# Patient Record
(demographics unavailable — no encounter records)

---

## 2024-10-16 NOTE — HEALTH RISK ASSESSMENT
[No] : No [No falls in past year] : Patient reported no falls in the past year [Former] : Former [< 15 Years] : < 15 Years

## 2024-10-16 NOTE — HISTORY OF PRESENT ILLNESS
[FreeTextEntry1] : Pt is a 73 y/o M who presents today for initial evaluation. 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 He is scheduled for R knee replacement with Dr Bhardwaj 11/14/2024 He states that he is feeling well overall - denies CP, SOB, diaphoresis, palpitations, dizziness, syncope, LE edema, PND, orthopnea.   TTE 10/2017 EF 55-60%, mild TR CUS 05/2018 mild plaque abd US 08/2020 no AAA   Previous hospitalizations: TIA Previous surgeries: L knee replaced 2021, shoulder replacement 04/2023 - no problems with anesthesia Family hx: no SCD, no cardiac disease Smoking status: quit at age 53 social ETOH no drug use Current exercise: light weights, walking daily 1/2 mile Daily water intake: couple of pints  Daily caffeine intake: 1 cup coffee OTC medications: tylenol PRN

## 2024-10-16 NOTE — REVIEW OF SYSTEMS
[Negative] : Genitourinary [FreeTextEntry6] : Mild dyspnea on exertion [FreeTextEntry9] : Right knee pain

## 2024-10-16 NOTE — DISCUSSION/SUMMARY
[FreeTextEntry1] : Pt is a 75 y/o M who presents today for initial evaluation. 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 He is scheduled for R knee replacement with Dr Bhardwaj 11/14/2024  Will check transthoracic echocardiogram to evaluate left ventricular function and assess for any structural abnormalities check nuc stress test  Further preop recommendations will be made once diagnostic testing is complete.   TIA: c/w lipitor and ASA  The described plan was discussed with the pt.  All questions and concerns were addressed to the best of my knowledge.  [EKG obtained to assist in diagnosis and management of assessed problem(s)] : EKG obtained to assist in diagnosis and management of assessed problem(s)

## 2024-10-16 NOTE — HISTORY OF PRESENT ILLNESS
[FreeTextEntry1] : Hyperlipidemia nonobstructive coronary artery disease [de-identified] : Patient is planning on right total knee replacement.  Has a history of nonobstructive coronary artery disease denies cardiovascular symptoms except for dyspnea on exertion I feel cardiac presurgical testing is indicated patient referred to cardiology.  Hyperlipidemia well-controlled on atorvastatin  Nephrolithiasis patient feels he might have passed a stone since he was seen last he is followed by urology every year history of hydronephrosis  Elevated A1c at 6.1 we will recheck it  Thrombocytopenia most recent platelet count 130,000 no easy bruising  History of pulmonary emboli in 1979 no recurrence  Pulmonary nodule followed regularly by cardiothoracic surgery

## 2024-11-06 NOTE — REASON FOR VISIT
[Home] : at home, [unfilled] , at the time of the visit. [Medical Office: ([facility])___] : at the medical office located in  [Verbal consent obtained from patient] : the patient, [unfilled]

## 2024-11-06 NOTE — REASON FOR VISIT
[Home] : at home, [unfilled] , at the time of the visit. [Medical Office: ([facility])___] : at the medical office located in  [Verbal consent obtained from patient] : the patient, [unfilled]

## 2024-11-07 NOTE — HISTORY OF PRESENT ILLNESS
[FreeTextEntry1] : Mr. NOHEMI GALVAN is a 75-year-old male referred by Dr. Brown who presents for a follow up appointment regarding a lung nodule found incidentally on lung cancer screening CT scan. Pertinent past medical history includes nephrolithiasis, hypertension, transient ischemic attack, smoking (37 years, quit 2003), multiple blood clots in his lungs in 1979.  4/5/24 CT Lung screening non-contrast at [facility] - Mild emphysema, linear atelectasis/scarring in the right lower lobe, and an ill-defined 9 mm subpleural nodule left upper lobe  8/7/24 CT Chest at [facility] Rad[facility]: - There is bibasal linear atelectasis/scarring - 9 mm subpleural left upper lobe nodule  11/4/24 CT chest at [facility]: - No suspicious mass or lymphadenopathy. - There is mild emphysema, stable bibasal linear atelectasis/scarring, and a stable 9 mm subpleural left lower lobe nodule  Mr. Galvan presents today to review surveillance CT chest imaging. Patient reports that he is feeling well; he denies chest pain, palpitations, shortness of breath, cough, dysphagia, nausea/vomiting, fevers, chills, fatigue, unintentional weight loss or gain, and night sweats. Of note, he states that he is having a knee replacement next week.

## 2024-11-07 NOTE — REVIEW OF SYSTEMS
[Fever] : no fever [Chills] : no chills [Feeling Poorly] : not feeling poorly [Feeling Tired] : not feeling tired [Chest Pain] : no chest pain [Palpitations] : no palpitations [Lower Ext Edema] : no extremity edema [Shortness Of Breath] : no shortness of breath [Wheezing] : no wheezing [Cough] : no cough [SOB on Exertion] : no shortness of breath during exertion [Negative] : Heme/Lymph

## 2024-11-07 NOTE — DATA REVIEWED
[FreeTextEntry1] : Independent review of imaging and independent interpretation was performed at today's visit: - 11/4/24 CT chest at [facility]

## 2024-11-07 NOTE — DATA REVIEWED
[FreeTextEntry1] : Independent review of imaging and independent interpretation was performed at today's visit: - 11/4/24 CT chest at [facility]

## 2024-11-07 NOTE — ASSESSMENT
[FreeTextEntry1] : Mr. NOHEMI KIRK is a 75-year-old male referred by Dr. Brown who presents for a follow up appointment regarding a lung nodule found incidentally on lung cancer screening CT scan. Pertinent past medical history includes nephrolithiasis, hypertension, transient ischemic attack, smoking (37 years, quit 2003), multiple blood clots in his lungs in 1979.  11/4/24 CT chest at [facility]: - No suspicious mass or lymphadenopathy. - There is mild emphysema, stable bibasal linear atelectasis/scarring, and a stable 9 mm subpleural left lower lobe nodule  After review of the imaging there is a nodule present in the lung. We discussed that nodules can be infectious, inflammatory or malignant in nature. When evaluating suspicious nodules, we look at certain characteristics of nodules such as size, consistency, shape, and rate of growth. Mr. Kirk has a stable 9mm subpleural left lower lobe nodule. Given the stability, I am recommending repeat CT chest imaging in 1 year.  I have reviewed the patient's medical records and diagnostic images at time of this consultation and have made the following recommendations: 1. CT scan of the chest, non-contrast, in 1 year 2. Return to care for review of CT imaging in 1 year, or sooner if needed.  All questions answered, patient verbalizes understanding and agrees to follow up accordingly.   ** Total time spent on this encounter was 5 minutes  I, Dr. Merino, personally performed the evaluation and management (E/M) services for this established patient who presents today with an existing condition(s).  That E/M includes conducting the examination, assessing all new/exacerbated conditions, and establishing a plan of care.  Today, my ACP, Brandy Hackett NP, was here to observe my evaluation and management services for this new problem/exacerbated condition to be followed going forward.

## 2024-11-07 NOTE — ASSESSMENT
[FreeTextEntry1] : Mr. NOHEMI KIRK is a 75-year-old male referred by Dr. Brown who presents for a follow up appointment regarding a lung nodule found incidentally on lung cancer screening CT scan. Pertinent past medical history includes nephrolithiasis, hypertension, transient ischemic attack, smoking (37 years, quit 2003), multiple blood clots in his lungs in 1979.  11/4/24 CT chest at [facility]: - No suspicious mass or lymphadenopathy. - There is mild emphysema, stable bibasal linear atelectasis/scarring, and a stable 9 mm subpleural left lower lobe nodule  After review of the imaging there is a nodule present in the lung. We discussed that nodules can be infectious, inflammatory or malignant in nature. When evaluating suspicious nodules, we look at certain characteristics of nodules such as size, consistency, shape, and rate of growth. Mr. Kirk has a stable 9mm subpleural left lower lobe nodule. Given the stability, I am recommending repeat CT chest imaging in 1 year.  I have reviewed the patient's medical records and diagnostic images at time of this consultation and have made the following recommendations: 1. CT scan of the chest, non-contrast, in 1 year 2. Return to care for review of CT imaging in 1 year, or sooner if needed.  All questions answered, patient verbalizes understanding and agrees to follow up accordingly.   ** Total time spent on this encounter was 5 minutes  I, Dr. Merino, personally performed the evaluation and management (E/M) services for this established patient who presents today with an existing condition(s).  That E/M includes conducting the examination, assessing all new/exacerbated conditions, and establishing a plan of care.  Today, my ACP, Brandy Hackett NP, was here to observe my evaluation and management services for this new problem/exacerbated condition to be followed going forward.

## 2024-11-07 NOTE — HISTORY OF PRESENT ILLNESS
[FreeTextEntry1] : Mr. NOHEMI GALVAN is a 75-year-old male referred by Dr. Brown who presents for a follow up appointment regarding a lung nodule found incidentally on lung cancer screening CT scan. Pertinent past medical history includes nephrolithiasis, hypertension, transient ischemic attack, smoking (37 years, quit 2003), multiple blood clots in his lungs in 1979.  4/5/24 CT Lung screening non-contrast at [facility] - Mild emphysema, linear atelectasis/scarring in the right lower lobe, and an ill-defined 9 mm subpleural nodule left upper lobe  8/7/24 CT Chest at [facility]: - There is bibasal linear atelectasis/scarring - 9 mm subpleural left upper lobe nodule  11/4/24 CT chest at [facility]: - No suspicious mass or lymphadenopathy. - There is mild emphysema, stable bibasal linear atelectasis/scarring, and a stable 9 mm subpleural left lower lobe nodule  Mr. Galvan presents today to review surveillance CT chest imaging. Patient reports that he is feeling well; he denies chest pain, palpitations, shortness of breath, cough, dysphagia, nausea/vomiting, fevers, chills, fatigue, unintentional weight loss or gain, and night sweats. Of note, he states that he is having a knee replacement next week.

## 2024-11-08 NOTE — HISTORY OF PRESENT ILLNESS
[No Pertinent Cardiac History] : no history of aortic stenosis, atrial fibrillation, coronary artery disease, recent myocardial infarction, or implantable device/pacemaker [No Pertinent Pulmonary History] : no history of asthma, COPD, sleep apnea, or smoking [No Adverse Anesthesia Reaction] : no adverse anesthesia reaction in self or family member [Chronic Anticoagulation] : no chronic anticoagulation [Chronic Kidney Disease] : no chronic kidney disease [Diabetes] : no diabetes [(Patient denies any chest pain, claudication, dyspnea on exertion, orthopnea, palpitations or syncope)] : Patient denies any chest pain, claudication, dyspnea on exertion, orthopnea, palpitations or syncope [FreeTextEntry1] : Right total knee replacement [FreeTextEntry2] : November 14 [FreeTextEntry3] : Dr. Bhardwaj [FreeTextEntry4] : Patient has debilitating pain right knee x-ray shows bone-on-bone to have total knee replacement  He had left total knee replacement approximately 3 years ago surgery was uneventful  He has been cleared by cardiology with a stress test echocardiogram both of which are normal  He has chronic slight thrombocytopenia platelet count 126,000 Nonobstructive coronary artery disease with a low calcium score on atorvastatin for primary prevention.  Patient walks at least half a mile daily no cardiovascular symptoms

## 2024-12-03 NOTE — HISTORY OF PRESENT ILLNESS
[0] : no pain reported [Chills] : no chills [Constipation] : no constipation [Diarrhea] : no diarrhea [Dysuria] : no dysuria [Fever] : no fever [Nausea] : no nausea [Vomiting] : no vomiting [Healed] : healed [Erythema] : not erythematous [Discharge] : absent of discharge [Swelling] : swollen [Dehiscence] : not dehisced [Neuro Intact] : an unremarkable neurological exam [Vascular Intact] : ~T peripheral vascular exam normal [Negative Deanna's] : maneuvers demonstrated a negative Deanna's sign [de-identified] : s/p Right total knee arthroplasty. Computer-assisted tibia resection, OrthAlign procedure DOS:11/14/24 [de-identified] : Patient is 3 weeks from right total knee arthroplasty he states he complains of tightness and pain in the right lower extremity at night when he rest he feels better with walking he has stopped using oxycodone for pain he is able to do some yard work outside blowing some leaves he is ambulating with a cane he is ready to start outpatient physical therapy he is taking Eliquis [de-identified] :  Right knee exam shows healing incision with no sign of infection, ROM 0 to 100 degrees. The surgical incision site(s) swollen, but clean, dry and intact, healed, not erythematous and not dehisced. Additional findings included an unremarkable neurological exam, peripheral vascular exam normal and maneuvers demonstrated a negative Deanna's sign.   [de-identified] :  3V xray of the right knee done in the office today and reviewed and demonstrates s/p implants in good positioning with no evidence of wear, loosening, or subsidence.   [de-identified] : Patient will start out pt physical therapy and low impact exercise. continue elevated, ice, and pain management.   Discontinue Eliquis and start baby aspirin twice a day for DVTP medication until December 24. follow-up in  6 weeks

## 2025-01-22 NOTE — END OF VISIT
[FreeTextEntry3] : Documented by Nacho Majano acting solely as a scribe for Dr. Wero Bhardwaj on this date 01/22/2025.   All Medical record entries made by the Scribe were at my, Dr. Wero Bhardwaj's, direction and personally dictated by me on 01/22/2025. I have reviewed the chart and agree that the record accurately reflects my personal performance of the history, physical exam, assessment and plan. I have also personally directed, reviewed, and agreed with the discharge instructions.

## 2025-01-22 NOTE — HISTORY OF PRESENT ILLNESS
[2] : the patient reports pain that is 2/10 in severity [Chills] : no chills [Constipation] : no constipation [Diarrhea] : no diarrhea [Dysuria] : no dysuria [Fever] : no fever [Nausea] : no nausea [Vomiting] : no vomiting [Xray (Date:___)] : [unfilled] Xray -  [de-identified] : s/p Right total knee arthroplasty. Computer-assisted tibia resection, OrthAlign procedure DOS:11/14/24.   [de-identified] : Donovan is a 75-year-old male who presents today for follow-up evaluation status post right total knee arthroplasty 11/14/2024.  He said overall, he is doing well.  He does report intermittent, mild to moderate sharp twinges of discomfort over the anterior aspect the knee when sitting for any extended amount of time.  This seems to be relatively short-lived and is relieved with ambulation.  He denies any mechanical symptoms or instability. [de-identified] : Examination of the right knee reveals well-healed surgical incision.  There is mild effusion without warmth or erythema.  Range of motion is 3 to 120 degrees of knee flexion.  No gross instability.  5/5 strength. [de-identified] : 3V xray of the right knee done in office today and reviewed by Dr. Wero Bhardwaj demonstrates s/p right knee implants in good positioning with no evidence of wear, loosening, or subsidence. [de-identified] : 75-year-old male status post right total knee arthroplasty on 11/14/2024.  His implants are stable on x-ray.  He does note some residual stiffness with regard to terminal extension. [de-identified] : We have agreed to continue with formalized physical therapy with a focus on obtaining full range of motion. A refill of physical therapy was provided.  Patient will follow-up in 6 weeks for re-evaluation.

## 2025-03-04 NOTE — HISTORY OF PRESENT ILLNESS
[Pain Location] : pain [Stable] : stable [1] : a current pain level of 1/10 [Rest] : relieved by rest [de-identified] : Status post right total knee arthroplasty on November 14, 2024 He is 2 weeks over 3-month from the surgery He is walking well but he continues complains of stiffness and swelling at the end of the day and after exercise.  Patient complains of mild pain in the anterior shin. he is in physical therapy he notes his condition is improving with PT would like to continue he is ambulating without walking assistive device he denies Severe pain he denies locking or buckling no fever chills [de-identified] : Swelling

## 2025-03-04 NOTE — REASON FOR VISIT
[Follow-Up Visit] : a follow-up visit for [Other: ____] : [unfilled] [FreeTextEntry2] : s/p Right total knee arthroplasty. Computer-assisted tibia resection, OrthAlign procedure DOS:11/14/24.

## 2025-03-04 NOTE — PHYSICAL EXAM
[de-identified] : GENERAL APPEARANCE: Well nourished and hydrated, pleasant, alert, and oriented x 3. Appears their stated age.  HEENT: Normocephalic, extraocular eye motion intact. Nasal septum midline. Oral cavity clear. External auditory canal clear.  RESPIRATORY: Breath sounds clear and audible in all lobes. No wheezing, No accessory muscle use. CARDIOVASCULAR: No apparent abnormalities. No lower leg edema. No varicosities. Pedal pulses are palpable. NEUROLOGIC: Sensation is normal, no muscle weakness in the upper or lower extremities. DERMATOLOGIC: No apparent skin lesions, moist, warm, no rash. SPINE: Cervical spine appears normal and moves freely; thoracic spine appears normal and moves freely; lumbosacral spine appears normal and moves freely, normal, nontender. MUSCULOSKELETAL: Hands, wrists, and elbows are normal and move freely, shoulders are normal and move freely.  Musculoskeletal 5/5 motor strength in bilateral lower extremities. Sensory: Intact in bilateral lower extremities. DTRs: Biceps, brachioradialis, triceps, patellar, ankle and plantar 2+ and symmetric bilaterally. Pulses: dorsalis pedis, posterior tibial, femoral, popliteal, and radial 2+ and symmetric bilaterally.  Constitutional: Alert and in no acute distress, but well-appearing.   [de-identified] : Right knee examination showed healed incision he has mild hyperpigmentation in the shin with mild swelling patient walks without antalgic gait but he continues to have flexion contraction of 2 degree when he extend the knee flexion is 115 degree [de-identified] :  3V xray of the right knee done in the office today and reviewed and demonstrates s/p implants in good positioning with no evidence of wear, loosening, or subsidence.

## 2025-03-04 NOTE — DISCUSSION/SUMMARY
[de-identified] : This is a 75-year-old male status post right total knee arthroplasty on November 14, 2024 he is little over 3 months from the surgery he is recovering well he continues to have some swelling after activities which is normal he has mild stiffness with extension but it is improving with physical therapy we recommend continuing physical therapy and low impact exercise his shin pain should improve with time I suspect related to postop inflammatory swelling that radiates down to his right lower extremity .  No sign of DVT or infection x-rays are stable showing implants are seated well onto his bones no sign of subsidence no sign of loosening no fracture.  I do not think he will need additional surgical intervention for his knees . if he regresses we will see him back sooner otherwise we can see him a year from the surgery.  We spoke about use of antibiotic prior to dental work for next 2 years I spent 20 minutes review of medical history, assessing current issue, conduct  physical exam ,  review of diagnostic studies,  discuss the diagnosis,  couseling regarding pathophysiology of its condition  and its treatment plan  with the patient.

## 2025-03-05 NOTE — PHYSICAL EXAM
[Normal] : soft, non-tender, non-distended, no masses palpated, no HSM and normal bowel sounds [de-identified] : Varicose veins right leg

## 2025-03-05 NOTE — HISTORY OF PRESENT ILLNESS
Problem: Chronic Conditions and Co-morbidities  Goal: Patient's chronic conditions and co-morbidity symptoms are monitored and maintained or improved  2/26/2024 1057 by Janett Kraus RN  Outcome: Progressing  2/26/2024 0833 by Britta Bradford RN  Outcome: Progressing     Problem: Discharge Planning  Goal: Discharge to home or other facility with appropriate resources  2/26/2024 1057 by Janett Kraus RN  Outcome: Progressing  2/26/2024 0833 by Britta Bradford RN  Outcome: Progressing     Problem: Pain  Goal: Verbalizes/displays adequate comfort level or baseline comfort level  2/26/2024 1057 by Janett Kraus RN  Outcome: Progressing  2/26/2024 0833 by Britta Bradford RN  Outcome: Progressing     Problem: Safety - Adult  Goal: Free from fall injury  2/26/2024 1057 by Janett Kraus RN  Outcome: Progressing  2/26/2024 0833 by Britta Bradford RN  Outcome: Progressing     Problem: Skin/Tissue Integrity  Goal: Absence of new skin breakdown  Description: 1.  Monitor for areas of redness and/or skin breakdown  2.  Assess vascular access sites hourly  3.  Every 4-6 hours minimum:  Change oxygen saturation probe site  4.  Every 4-6 hours:  If on nasal continuous positive airway pressure, respiratory therapy assess nares and determine need for appliance change or resting period.  2/26/2024 1057 by Janett Kraus RN  Outcome: Progressing  2/26/2024 0833 by Britta Bradford RN  Outcome: Progressing     Problem: Risk for Elopement  Goal: Patient will not exit the unit/facility without proper excort  2/26/2024 1057 by Janett Kraus RN  Outcome: Progressing  Flowsheets (Taken 2/26/2024 1000)  Nursing Interventions for Elopement Risk:   Collaborate with family members/caregivers to mitigate the elopement risk   Collaborate with treatment team for drug withdrawal symptoms treatment   Collaborate with treatment team for nicotine replacement  2/26/2024 0833 by Britta Bradford RN  Outcome: Progressing  Flowsheets (Taken  [de-identified] : Status post total right knee replacement there is still slight swelling and warmth however no significant pain  Nonobstructive coronary artery disease no chest pain with exertion followed by cardiology normal stress test November 2024  Nonobstructive nephrolithiasis followed by urology  Mild thrombocytopenia remains unchanged  On atorvastatin for primary prevention

## 2025-07-15 NOTE — DISCUSSION/SUMMARY
[de-identified] : Damien is a 75-year-old male presenting today for follow-up of his right shoulder. At this time, I recommended that he continue with home exercises and begin formal physical therapy to support shoulder function and reduce stiffness. I also advised that before playing golf, he should perform a warm-up routine focused on shoulder mobility, and after golfing, he should apply ice to the shoulder to minimize inflammation. All questions were answered, and Damien voiced clear understanding of the plan. He has my contact information and was advised to reach out sooner with any questions or concerns.

## 2025-07-15 NOTE — PHYSICAL EXAM
[de-identified] : General: Well appearing, no acute distress Neurologic: A&Ox3, No focal deficits Head: NCAT without abrasions, lacerations, or ecchymosis to head, face, or scalp Eyes: No scleral icterus, no gross abnormalities Respiratory: Equal chest wall expansion bilaterally, no accessory muscle use Lymphatic: No lymphadenopathy palpated Skin: Warm and dry Psychiatric: Normal mood and affect  Physicall Exam: Forward flexion to 150 degrees on the right. External rotation: 50 degrees on the left, limited on the right. Internal rotation reaches L1 level. [de-identified] : AP, grashey, axillary, and scapular Y xrays, of the right shoulder taken today in the office, are reviewed.  These reveal a reverse total shoulder arthroplasty in good position and alignment. No notching noted. No lucencies

## 2025-07-15 NOTE — PHYSICAL EXAM
[de-identified] : General: Well appearing, no acute distress Neurologic: A&Ox3, No focal deficits Head: NCAT without abrasions, lacerations, or ecchymosis to head, face, or scalp Eyes: No scleral icterus, no gross abnormalities Respiratory: Equal chest wall expansion bilaterally, no accessory muscle use Lymphatic: No lymphadenopathy palpated Skin: Warm and dry Psychiatric: Normal mood and affect  Physicall Exam: Forward flexion to 150 degrees on the right. External rotation: 50 degrees on the left, limited on the right. Internal rotation reaches L1 level. [de-identified] : AP, grashey, axillary, and scapular Y xrays, of the right shoulder taken today in the office, are reviewed.  These reveal a reverse total shoulder arthroplasty in good position and alignment. No notching noted. No lucencies

## 2025-07-15 NOTE — DISCUSSION/SUMMARY
[de-identified] : Damien is a 75-year-old male presenting today for follow-up of his right shoulder. At this time, I recommended that he continue with home exercises and begin formal physical therapy to support shoulder function and reduce stiffness. I also advised that before playing golf, he should perform a warm-up routine focused on shoulder mobility, and after golfing, he should apply ice to the shoulder to minimize inflammation. All questions were answered, and Damien voiced clear understanding of the plan. He has my contact information and was advised to reach out sooner with any questions or concerns.

## 2025-07-15 NOTE — HISTORY OF PRESENT ILLNESS
[de-identified] : Damien is a 75-year-old male here today for a follow-up regarding his right shoulder. He reports experiencing spasms and stiffness in the shoulder that feel intense, as if it's going to "implode" on him. These episodes have occurred about 3-4 times since his surgery and typically resolve on their own. He mentions the spasms usually happen while playing golf. Otherwise he is doing well at this time.

## 2025-07-15 NOTE — HISTORY OF PRESENT ILLNESS
[de-identified] : Damien is a 75-year-old male here today for a follow-up regarding his right shoulder. He reports experiencing spasms and stiffness in the shoulder that feel intense, as if it's going to "implode" on him. These episodes have occurred about 3-4 times since his surgery and typically resolve on their own. He mentions the spasms usually happen while playing golf. Otherwise he is doing well at this time.

## 2025-07-15 NOTE — ADDENDUM
[[email or web address]] : Documented by Taryn Barron acting as a scribe for  on 07/11/2025 and was presence for the following sections: Physical Exam; Data Reviewed; Assessment; Discussion/Summary. All medical record entries made by the Scribe were at my, Dr. Everett's direction and personally dictated by me on 07/11/2025. I have reviewed the chart and agree that the record accurately reflects my personal performance of the history, physical exam, procedure and imaging